# Patient Record
Sex: MALE | Race: BLACK OR AFRICAN AMERICAN | NOT HISPANIC OR LATINO | Employment: OTHER | URBAN - METROPOLITAN AREA
[De-identification: names, ages, dates, MRNs, and addresses within clinical notes are randomized per-mention and may not be internally consistent; named-entity substitution may affect disease eponyms.]

---

## 2023-04-08 PROBLEM — S60.459A SUPERFICIAL FOREIGN BODY OF FINGER: Status: ACTIVE | Noted: 2023-04-08

## 2023-04-08 PROBLEM — K64.4 INFLAMED EXTERNAL HEMORRHOID: Status: ACTIVE | Noted: 2023-04-08

## 2023-04-08 PROBLEM — T63.441A BEE STING: Status: ACTIVE | Noted: 2023-04-08

## 2023-04-08 PROBLEM — S80.812A ABRASION OF LEFT LEG: Status: ACTIVE | Noted: 2023-04-08

## 2023-04-08 PROBLEM — R09.81 NASAL CONGESTION: Status: ACTIVE | Noted: 2023-04-08

## 2023-04-08 PROBLEM — G51.0 BELL'S PALSY: Status: ACTIVE | Noted: 2023-04-08

## 2023-04-08 RX ORDER — POLYETHYLENE GLYCOL 3350 17 G/17G
POWDER, FOR SOLUTION ORAL
COMMUNITY
Start: 2021-05-28

## 2023-04-08 RX ORDER — LIDOCAINE 50 MG/G
OINTMENT TOPICAL
COMMUNITY
Start: 2021-06-03

## 2023-04-08 RX ORDER — DOCUSATE SODIUM 100 MG/1
1 CAPSULE, LIQUID FILLED ORAL 2 TIMES DAILY
COMMUNITY
Start: 2021-05-28

## 2023-04-08 RX ORDER — HYDROCORTISONE 25 MG/G
OINTMENT TOPICAL
COMMUNITY
Start: 2021-06-03

## 2023-04-10 ENCOUNTER — OFFICE VISIT (OUTPATIENT)
Dept: PRIMARY CARE | Facility: CLINIC | Age: 53
End: 2023-04-10
Payer: COMMERCIAL

## 2023-04-10 VITALS
WEIGHT: 131 LBS | HEIGHT: 65 IN | BODY MASS INDEX: 21.83 KG/M2 | TEMPERATURE: 98.3 F | DIASTOLIC BLOOD PRESSURE: 72 MMHG | HEART RATE: 82 BPM | OXYGEN SATURATION: 98 % | SYSTOLIC BLOOD PRESSURE: 122 MMHG | RESPIRATION RATE: 17 BRPM

## 2023-04-10 DIAGNOSIS — R20.0 NUMBNESS AND TINGLING IN LEFT ARM: ICD-10-CM

## 2023-04-10 DIAGNOSIS — R20.0 NUMBNESS AND TINGLING IN LEFT ARM: Primary | ICD-10-CM

## 2023-04-10 DIAGNOSIS — R20.2 NUMBNESS AND TINGLING IN LEFT ARM: Primary | ICD-10-CM

## 2023-04-10 DIAGNOSIS — R20.2 NUMBNESS AND TINGLING IN LEFT ARM: ICD-10-CM

## 2023-04-10 PROCEDURE — 99213 OFFICE O/P EST LOW 20 MIN: CPT | Performed by: STUDENT IN AN ORGANIZED HEALTH CARE EDUCATION/TRAINING PROGRAM

## 2023-04-10 RX ORDER — CYCLOBENZAPRINE HCL 5 MG
5 TABLET ORAL NIGHTLY PRN
Qty: 14 TABLET | Refills: 0 | Status: SHIPPED | OUTPATIENT
Start: 2023-04-10 | End: 2023-04-24

## 2023-04-10 RX ORDER — METHYLPREDNISOLONE 4 MG/1
TABLET ORAL
Qty: 21 TABLET | Refills: 0 | Status: SHIPPED | OUTPATIENT
Start: 2023-04-10 | End: 2023-04-17

## 2023-04-10 RX ORDER — CYCLOBENZAPRINE HCL 5 MG
5 TABLET ORAL NIGHTLY PRN
Qty: 14 TABLET | Refills: 0 | Status: SHIPPED | OUTPATIENT
Start: 2023-04-10 | End: 2023-04-10 | Stop reason: SDUPTHER

## 2023-04-10 RX ORDER — METHYLPREDNISOLONE 4 MG/1
TABLET ORAL
Qty: 21 TABLET | Refills: 0 | Status: SHIPPED | OUTPATIENT
Start: 2023-04-10 | End: 2023-04-10 | Stop reason: SDUPTHER

## 2023-04-10 ASSESSMENT — ENCOUNTER SYMPTOMS
SHORTNESS OF BREATH: 0
NUMBNESS: 1
CHILLS: 0
CONFUSION: 0
DIZZINESS: 0
FEVER: 0

## 2023-04-10 NOTE — PROGRESS NOTES
I reviewed with the resident the medical history and the resident’s findings on physical examination.  I discussed with the resident the patient’s diagnosis and concur with the treatment plan as documented in the resident note.   Patient has had the symptoms for a few months.  Pretty constant.  Who radiate to the hand.  Involves most of the hand.  He does do repetitive work.  No trauma, no weakness.  No chest pain or shortness of breath.  No sweating no other symptoms.  We will get x-rays patient to do exercises we will try medication if it still going on he will need EMG possible MRI of the neck.  May need to see orthopedics  Patient aware this can cause long-term damage to the muscles of it does not improve.  If any chest pain shortness of breath any nausea vomiting and increased symptoms any weakness any change in the pattern notify the office or go to the ER  Follow-up in 2 to 4 weeks  Agree with assessment and plan  Darrian Carter, DO

## 2023-04-10 NOTE — PROGRESS NOTES
"Subjective   Patient ID: Jacob Horta is a 53 y.o. male who presents for Numbness.    HPI   53 year old male presenting to clinic today with a 2 month history of left arm numbness and tingling that originates at the top of his left arm and radiates down to his left hand.    Experiences symptoms daily. Lasts for hours.  Current Management: Warm soaks and flexeril - both have helping    Denies neck or back pain.  Denies current or former injury to the area. No prior surgeries to the area.  Denies recent illness.  No chest pain, diaphoresis, shortness of breath.  Is a current smoker.    Review of Systems   Constitutional:  Negative for chills and fever.   Respiratory:  Negative for shortness of breath.    Cardiovascular:  Negative for chest pain.   Neurological:  Positive for numbness. Negative for dizziness.   Psychiatric/Behavioral:  Negative for confusion.    All other systems reviewed and are negative.      Objective   /72 (BP Location: Left arm, Patient Position: Sitting, BP Cuff Size: Adult)   Pulse 82   Temp 36.8 °C (98.3 °F)   Resp 17   Ht 1.651 m (5' 5\")   Wt 59.4 kg (131 lb)   SpO2 98%   BMI 21.80 kg/m²     Physical Exam  Vitals and nursing note reviewed.   Constitutional:       General: He is not in acute distress.     Appearance: He is not toxic-appearing.   Eyes:      Conjunctiva/sclera: Conjunctivae normal.   Cardiovascular:      Rate and Rhythm: Normal rate and regular rhythm.      Pulses: Normal pulses.   Pulmonary:      Effort: Pulmonary effort is normal.      Breath sounds: Normal breath sounds.   Musculoskeletal:      Right shoulder: No tenderness. Normal range of motion. Normal strength.      Left shoulder: No tenderness. Normal range of motion. Normal strength.      Right upper arm: No tenderness.      Left upper arm: No tenderness.      Right forearm: No tenderness.      Left forearm: No tenderness.      Right wrist: Normal pulse.      Left wrist: Normal pulse.      Cervical back: " Full passive range of motion without pain and normal range of motion. No signs of trauma or tenderness. No pain with movement, spinous process tenderness or muscular tenderness. Normal range of motion.      Thoracic back: Normal. Normal range of motion.      Lumbar back: No tenderness or bony tenderness.   Skin:     Findings: Lesion (Large lipoma at upper thoracic back, midline. Small lipoma noted at left upper thoracic paraspinal.) present.   Neurological:      General: No focal deficit present.      Mental Status: He is alert.      Cranial Nerves: Cranial nerves 2-12 are intact. No cranial nerve deficit.      Sensory: No sensory deficit.      Motor: No weakness.      Comments: Spurling sign negative bilaterally.   Psychiatric:         Mood and Affect: Mood normal.         Assessment/Plan   Problem List Items Addressed This Visit          Nervous    Numbness and tingling in left arm - Primary     Two month history of left arm numbness and tingling without known injury.  No signs / symptoms concerning for cardiac origin.  History consistent with neurologic etiology, possibly cervical radiculopathy.  Physical examination unremarkable today.  Will order cervical and thoracic x rays.  Rx sent for steroid for possible inflammation.   Rx sent for muscle relaxer.  Discussed other conservative management.  Follow up in 2-4 weeks or sooner as needed. If symptoms not resolving, will consider referral to PT or possible EMG for further evaluation.         Relevant Medications    methylPREDNISolone (Medrol Dospak) 4 mg tablets    cyclobenzaprine (Flexeril) 5 mg tablet    Other Relevant Orders    XR cervical spine complete 4-5 views    XR thoracic spine 3 views     Discussed with attending physician.    Rosetta Velez, DO  PGY3

## 2023-04-10 NOTE — ASSESSMENT & PLAN NOTE
Two month history of left arm numbness and tingling without known injury.  No signs / symptoms concerning for cardiac origin.  History consistent with neurologic etiology, possibly cervical radiculopathy.  Physical examination unremarkable today.  Will order cervical and thoracic x rays.  Rx sent for steroid for possible inflammation.   Rx sent for muscle relaxer.  Discussed other conservative management.  Follow up in 2-4 weeks or sooner as needed. If symptoms not resolving, will consider referral to PT or possible EMG for further evaluation.

## 2023-04-13 ENCOUNTER — TELEPHONE (OUTPATIENT)
Dept: PRIMARY CARE | Facility: CLINIC | Age: 53
End: 2023-04-13
Payer: COMMERCIAL

## 2023-04-13 NOTE — TELEPHONE ENCOUNTER
----- Message from Darrian Carter DO sent at 4/12/2023 12:49 PM EDT -----  Patient saw , he has a lot of degenerative changes in his mid cervical and thoracic spine.  Sure he has follow-up with us in the next 2 weeks

## 2023-04-20 ENCOUNTER — TELEPHONE (OUTPATIENT)
Dept: PRIMARY CARE | Facility: CLINIC | Age: 53
End: 2023-04-20
Payer: COMMERCIAL

## 2023-04-20 NOTE — TELEPHONE ENCOUNTER
----- Message from Rosetta Velez DO sent at 4/20/2023  9:12 AM EDT -----  Can someone please call this patient to have him schedule a follow up for re-evaluation of his left arm numbness? Thanks!

## 2023-04-24 ENCOUNTER — OFFICE VISIT (OUTPATIENT)
Dept: PRIMARY CARE | Facility: CLINIC | Age: 53
End: 2023-04-24
Payer: COMMERCIAL

## 2023-04-24 VITALS
BODY MASS INDEX: 22.16 KG/M2 | HEART RATE: 70 BPM | TEMPERATURE: 98.4 F | SYSTOLIC BLOOD PRESSURE: 142 MMHG | DIASTOLIC BLOOD PRESSURE: 88 MMHG | HEIGHT: 65 IN | WEIGHT: 133 LBS | OXYGEN SATURATION: 98 %

## 2023-04-24 DIAGNOSIS — Z12.11 ENCOUNTER FOR SCREENING FOR MALIGNANT NEOPLASM OF COLON: ICD-10-CM

## 2023-04-24 DIAGNOSIS — R20.2 NUMBNESS AND TINGLING IN LEFT ARM: Primary | ICD-10-CM

## 2023-04-24 DIAGNOSIS — Z80.0 FAMILY HISTORY OF COLON CANCER: ICD-10-CM

## 2023-04-24 DIAGNOSIS — R20.0 NUMBNESS AND TINGLING IN LEFT ARM: Primary | ICD-10-CM

## 2023-04-24 PROCEDURE — 99213 OFFICE O/P EST LOW 20 MIN: CPT | Performed by: STUDENT IN AN ORGANIZED HEALTH CARE EDUCATION/TRAINING PROGRAM

## 2023-04-24 RX ORDER — MELOXICAM 15 MG/1
15 TABLET ORAL DAILY
Qty: 30 TABLET | Refills: 1 | Status: SHIPPED | OUTPATIENT
Start: 2023-04-24 | End: 2024-04-23

## 2023-04-24 ASSESSMENT — ENCOUNTER SYMPTOMS
NECK STIFFNESS: 0
NECK PAIN: 0
DIZZINESS: 0
SHORTNESS OF BREATH: 0
LIGHT-HEADEDNESS: 0
CHEST TIGHTNESS: 0
WEAKNESS: 0
NUMBNESS: 1
PALPITATIONS: 0
HEADACHES: 0
BACK PAIN: 0

## 2023-04-24 NOTE — PROGRESS NOTES
"Subjective   Patient ID: Jacob Horta is a 53 y.o. male who presents for Shoulder Pain (Left shoulder follow up./No pain or concerns at the moment).    HPI     53 year old male following up for left arm numbness. Patient was seen two weeks ago for this complaint and was provided with muscle relaxant and a medrol dosepak.    A cervical and thoracic x ray was ordered at that time which showed moderate degenerative changes.    Today, patient notes approximately 40% improvement since his last visit. States the numbness does not happen as often and does not last as long. Only took flexeril once or twice because it made him feel too sedated. Numbness still radiates from left shoulder to hand. Sometimes if he lies on his left elbow this will trigger symptoms. 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 He denies any injury or range of motion difficulty. He is not experiencing symptoms at the time of the visit.    Review of Systems   Respiratory:  Negative for chest tightness and shortness of breath.    Cardiovascular:  Negative for chest pain and palpitations.   Musculoskeletal:  Negative for back pain, neck pain and neck stiffness.   Neurological:  Positive for numbness. Negative for dizziness, weakness, light-headedness and headaches.   All other systems reviewed and are negative.      Objective   /88 (BP Location: Right arm, Patient Position: Sitting, BP Cuff Size: Adult)   Pulse 70   Temp 36.9 °C (98.4 °F) (Temporal)   Ht 1.651 m (5' 5\")   Wt 60.3 kg (133 lb)   SpO2 98%   BMI 22.13 kg/m²     Physical Exam  Vitals and nursing note reviewed.   Constitutional:       General: He is not in acute distress.     Appearance: Normal appearance. He is not toxic-appearing.   Eyes:      Conjunctiva/sclera: Conjunctivae normal.   Cardiovascular:      Rate and Rhythm: Normal rate and regular rhythm.     "  Heart sounds: Normal heart sounds.   Pulmonary:      Effort: Pulmonary effort is normal.   Musculoskeletal:         General: No deformity. Normal range of motion.      Cervical back: Normal range of motion and neck supple. No rigidity or tenderness.   Lymphadenopathy:      Cervical: No cervical adenopathy.   Skin:     General: Skin is warm and dry.   Neurological:      General: No focal deficit present.      Mental Status: He is alert.      Cranial Nerves: No cranial nerve deficit.      Sensory: No sensory deficit.      Motor: No weakness.   Psychiatric:         Mood and Affect: Mood normal.         Assessment/Plan   Problem List Items Addressed This Visit          Nervous    Numbness and tingling in left arm - Primary     Follow up of intermittent left arm numbness. S/p medrol dosepak and reporting 40% improvement in symptoms.  Again, no signs / symptoms concerning for cardiac origin.  Cervical and thoracic x rays notable for moderate degenerative changes.  Physical examination unremarkable today.  Rx sent for meloxicam.   Referral placed for PT.  Discussed other conservative management.  Follow up in 4-6 weeks or sooner as needed. If symptoms not resolving, will consider possible EMG for further evaluation.         Relevant Medications    meloxicam (Mobic) 15 mg tablet    Other Relevant Orders    Referral to Physical Therapy       Other    Encounter for screening for malignant neoplasm of colon                Relevant Orders    Colonoscopy    Family history of colon cancer     Father         Relevant Orders    Colonoscopy     Discussed with attending physician.    Rosetta Velez, DO  PGY3

## 2023-04-24 NOTE — PROGRESS NOTES
I reviewed with the resident the medical history and the resident’s findings on physical examination.  I discussed with the resident the patient’s diagnosis and concur with the treatment plan as documented in the resident note.   Patient symptoms have improved.  Happening less frequently and for possible time.  However patient still having symptoms.  We will start physical therapy.  He declined the EMG at this point.  May need an MRI and the EMG.  Patient aware if any increase symptoms any worsening symptoms any weakness any numbness tingling notify office or go to the ER.  Patient aware if this continues he could have long-term complications.  Follow-up in 3 to 4 weeks  Agree with assessment and plan    Darrian Carter, DO

## 2023-04-24 NOTE — ASSESSMENT & PLAN NOTE
Follow up of intermittent left arm numbness. S/p medrol dosepak and reporting 40% improvement in symptoms.  Again, no signs / symptoms concerning for cardiac origin.  Cervical and thoracic x rays notable for moderate degenerative changes.  Physical examination unremarkable today.  Rx sent for meloxicam.   Referral placed for PT.  Discussed other conservative management.  Follow up in 4-6 weeks or sooner as needed. If symptoms not resolving, will consider possible EMG for further evaluation.

## 2023-07-18 ENCOUNTER — HOSPITAL ENCOUNTER (OUTPATIENT)
Dept: DATA CONVERSION | Facility: HOSPITAL | Age: 53
End: 2023-07-18
Attending: INTERNAL MEDICINE | Admitting: INTERNAL MEDICINE
Payer: COMMERCIAL

## 2023-07-18 DIAGNOSIS — Z12.11 ENCOUNTER FOR SCREENING FOR MALIGNANT NEOPLASM OF COLON: ICD-10-CM

## 2023-07-18 DIAGNOSIS — K64.4 RESIDUAL HEMORRHOIDAL SKIN TAGS: ICD-10-CM

## 2023-07-18 DIAGNOSIS — K57.30 DIVERTICULOSIS OF LARGE INTESTINE WITHOUT PERFORATION OR ABSCESS WITHOUT BLEEDING: ICD-10-CM

## 2023-07-18 DIAGNOSIS — D12.3 BENIGN NEOPLASM OF TRANSVERSE COLON: ICD-10-CM

## 2023-07-18 DIAGNOSIS — Z80.0 FAMILY HISTORY OF MALIGNANT NEOPLASM OF DIGESTIVE ORGANS: ICD-10-CM

## 2023-07-18 DIAGNOSIS — K64.8 OTHER HEMORRHOIDS: ICD-10-CM

## 2023-07-25 LAB
COMPLETE PATHOLOGY REPORT: NORMAL
CONVERTED CLINICAL DIAGNOSIS-HISTORY: NORMAL
CONVERTED FINAL DIAGNOSIS: NORMAL
CONVERTED FINAL REPORT PDF LINK TO COPY AND PASTE: NORMAL
CONVERTED GROSS DESCRIPTION: NORMAL

## 2023-09-29 VITALS
DIASTOLIC BLOOD PRESSURE: 78 MMHG | BODY MASS INDEX: 21.16 KG/M2 | HEART RATE: 57 BPM | SYSTOLIC BLOOD PRESSURE: 118 MMHG | RESPIRATION RATE: 16 BRPM | TEMPERATURE: 97.5 F | WEIGHT: 126.98 LBS | HEIGHT: 65 IN

## 2023-09-30 NOTE — H&P
History of Present Illness:   History Present Illness:  Reason for surgery: Screening colonoscopy   HPI:    Screening colonoscopy.  Family history of colon cancer in his father, aged 54 years.    Allergies:        Allergies:  ·  No Known Allergies :     Home Medication Review:   Home Medications Reviewed: no     Impression/Procedure:   ·  Impression and Planned Procedure: Family history of colon cancer in his father, aged 54 years.  --> For screening colonoscopy       ERAS (Enhanced Recovery After Surgery):  ·  ERAS Patient: no       Vital Signs:  Temperature C: 36.4 degrees C   Temperature F: 97.5 degrees F   Heart Rate: 57 beats per minute   Respiratory Rate: 16 breath per minute   Blood Pressure Systolic: 118 mm/Hg   Blood Pressure Diastolic: 78 mm/Hg     Physical Exam by System:    Respiratory/Thorax: Patent airways, CTAB, normal  breath sounds with good chest expansion, thorax symmetric   Cardiovascular: Regular, rate and rhythm, no murmurs,  2+ equal pulses of the extremities, normal S 1and S 2     Consent:   COVID-19 Consent:  ·  COVID-19 Risk Consent Surgeon has reviewed key risks related to the risk of kimberly COVID-19 and if they contract COVID-19 what the risks are.       Electronic Signatures:  Nic Britt)  (Signed 18-Jul-2023 15:20)   Authored: History of Present Illness, Allergies, Home  Medication Review, Impression/Procedure, ERAS, Physical Exam, Consent, Note Completion      Last Updated: 18-Jul-2023 15:20 by Nic Britt)

## 2024-10-15 ENCOUNTER — OFFICE VISIT (OUTPATIENT)
Dept: PRIMARY CARE | Facility: CLINIC | Age: 54
End: 2024-10-15

## 2024-10-15 VITALS
WEIGHT: 131 LBS | RESPIRATION RATE: 18 BRPM | HEIGHT: 65 IN | BODY MASS INDEX: 21.83 KG/M2 | SYSTOLIC BLOOD PRESSURE: 118 MMHG | OXYGEN SATURATION: 99 % | DIASTOLIC BLOOD PRESSURE: 72 MMHG | HEART RATE: 63 BPM | TEMPERATURE: 97.9 F

## 2024-10-15 DIAGNOSIS — S29.012A STRAIN OF THORACIC BACK REGION: ICD-10-CM

## 2024-10-15 DIAGNOSIS — M54.50 LUMBAR BACK PAIN: ICD-10-CM

## 2024-10-15 DIAGNOSIS — S46.911A RIGHT SHOULDER STRAIN, INITIAL ENCOUNTER: Primary | ICD-10-CM
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
## 2024-10-15 DIAGNOSIS — S63.602A SPRAIN OF LEFT THUMB, UNSPECIFIED SITE OF DIGIT, INITIAL ENCOUNTER: ICD-10-CM

## 2024-10-15 DIAGNOSIS — M79.645 THUMB PAIN, LEFT: ICD-10-CM

## 2024-10-15 PROCEDURE — 3008F BODY MASS INDEX DOCD: CPT

## 2024-10-15 PROCEDURE — 99214 OFFICE O/P EST MOD 30 MIN: CPT

## 2024-10-15 RX ORDER — MELOXICAM 15 MG/1
15 TABLET ORAL DAILY
Qty: 14 TABLET | Refills: 0 | Status: SHIPPED | OUTPATIENT
Start: 2024-10-15 | End: 2024-10-29

## 2024-10-15 RX ORDER — CYCLOBENZAPRINE HCL 10 MG
TABLET ORAL EVERY 8 HOURS PRN
COMMUNITY
Start: 2024-10-13 | End: 2024-10-18

## 2024-10-15 RX ORDER — IBUPROFEN 600 MG/1
1 TABLET ORAL EVERY 6 HOURS PRN
COMMUNITY
Start: 2024-10-13 | End: 2024-10-15 | Stop reason: WASHOUT

## 2024-10-15 ASSESSMENT — ENCOUNTER SYMPTOMS
JOINT SWELLING: 1
ARTHRALGIAS: 1
BACK PAIN: 1

## 2024-10-15 NOTE — PATIENT INSTRUCTIONS
Jacob,     We will refer you to physical therapy. Please call [phone] to schedule.     Please get repeat x-rays in one week on your hand.     Please take Meloxicam once daily for the next two weeks. Do no take any other ibuprofen or aleve with this.
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
## 2024-10-17 NOTE — PROGRESS NOTES
I saw and evaluated the patient. I personally obtained the key and critical portions of the history and physical exam or was physically present for key and critical portions performed by the resident/fellow. I reviewed the resident/fellow's documentation and discussed the patient with the resident/fellow. I agree with the resident/fellow's medical decision making as documented in the note.    Ollie Porter, DO

## 2024-11-06 ENCOUNTER — HOSPITAL ENCOUNTER (OUTPATIENT)
Dept: RADIOLOGY | Facility: CLINIC | Age: 54
Discharge: HOME | End: 2024-11-06
Payer: COMMERCIAL

## 2024-11-06 ENCOUNTER — APPOINTMENT (OUTPATIENT)
Dept: PRIMARY CARE | Facility: CLINIC | Age: 54
End: 2024-11-06
Payer: COMMERCIAL

## 2024-11-06 VITALS
TEMPERATURE: 97.7 F | DIASTOLIC BLOOD PRESSURE: 87 MMHG | HEART RATE: 87 BPM | BODY MASS INDEX: 21.97 KG/M2 | OXYGEN SATURATION: 96 % | WEIGHT: 132 LBS | SYSTOLIC BLOOD PRESSURE: 128 MMHG | RESPIRATION RATE: 16 BRPM

## 2024-11-06 DIAGNOSIS — S69.92XD INJURY OF LEFT THUMB, SUBSEQUENT ENCOUNTER: Primary | ICD-10-CM

## 2024-11-06 DIAGNOSIS — S63.602A SPRAIN OF LEFT THUMB, UNSPECIFIED SITE OF DIGIT, INITIAL ENCOUNTER: ICD-10-CM

## 2024-11-06 PROCEDURE — 73130 X-RAY EXAM OF HAND: CPT | Mod: LT

## 2024-11-06 PROCEDURE — 99213 OFFICE O/P EST LOW 20 MIN: CPT
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
## 2024-11-07 NOTE — PROGRESS NOTES
I saw and evaluated the patient. I personally obtained the key and critical portions of the history and physical exam or was physically present for key and critical portions performed by the resident/fellow. I reviewed the resident/fellow's documentation and discussed the patient with the resident/fellow. I agree with the resident/fellow's medical decision making as documented in the note.    Left thumb pain/swelling - agree with repeat xray to evaluate for fracture.  Agree with hand ortho evaluation.      Jeffery Dias, DO

## 2024-11-20 ENCOUNTER — OFFICE VISIT (OUTPATIENT)
Dept: ORTHOPEDIC SURGERY | Facility: CLINIC | Age: 54
End: 2024-11-20
Payer: COMMERCIAL

## 2024-11-20 DIAGNOSIS — S63.642A SPRAIN OF ULNAR COLLATERAL LIGAMENT OF METACARPOPHALANGEAL (MCP) JOINT OF LEFT THUMB, INITIAL ENCOUNTER: ICD-10-CM

## 2024-11-20 DIAGNOSIS — S69.92XD INJURY OF LEFT THUMB, SUBSEQUENT ENCOUNTER: ICD-10-CM

## 2024-11-20 PROCEDURE — L3924 HFO WITHOUT JOINTS PRE OTS: HCPCS | Performed by: ORTHOPAEDIC SURGERY

## 2024-11-20 PROCEDURE — 99214 OFFICE O/P EST MOD 30 MIN: CPT | Mod: GC | Performed by: ORTHOPAEDIC SURGERY

## 2024-11-20 PROCEDURE — 99204 OFFICE O/P NEW MOD 45 MIN: CPT | Performed by: ORTHOPAEDIC SURGERY
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
## 2024-11-28 PROBLEM — S63.642A SPRAIN OF ULNAR COLLATERAL LIGAMENT OF METACARPOPHALANGEAL (MCP) JOINT OF LEFT THUMB: Status: ACTIVE | Noted: 2024-11-28

## 2025-01-10 ENCOUNTER — CLINICAL SUPPORT (OUTPATIENT)
Dept: OCCUPATIONAL THERAPY | Facility: CLINIC | Age: 55
End: 2025-01-10
Payer: COMMERCIAL

## 2025-01-10 DIAGNOSIS — M79.645 PAIN OF LEFT THUMB: ICD-10-CM

## 2025-01-10 DIAGNOSIS — S63.642D SPRAIN OF ULNAR COLLATERAL LIGAMENT OF METACARPOPHALANGEAL (MCP) JOINT OF LEFT THUMB, SUBSEQUENT ENCOUNTER: Primary | ICD-10-CM

## 2025-01-10 PROCEDURE — 97165 OT EVAL LOW COMPLEX 30 MIN: CPT | Mod: GO

## 2025-01-10 PROCEDURE — 97035 APP MDLTY 1+ULTRASOUND EA 15: CPT | Mod: GO
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
## 2025-01-17 ENCOUNTER — TREATMENT (OUTPATIENT)
Dept: OCCUPATIONAL THERAPY | Facility: CLINIC | Age: 55
End: 2025-01-17
Payer: COMMERCIAL

## 2025-01-17 DIAGNOSIS — M79.645 PAIN OF LEFT THUMB: ICD-10-CM

## 2025-01-17 DIAGNOSIS — S63.642D SPRAIN OF ULNAR COLLATERAL LIGAMENT OF METACARPOPHALANGEAL (MCP) JOINT OF LEFT THUMB, SUBSEQUENT ENCOUNTER: Primary | ICD-10-CM

## 2025-01-17 PROCEDURE — 97112 NEUROMUSCULAR REEDUCATION: CPT | Mod: GO

## 2025-01-17 PROCEDURE — 97035 APP MDLTY 1+ULTRASOUND EA 15: CPT | Mod: GO

## 2025-01-17 NOTE — PROGRESS NOTES
Occupational Therapy               Patient Name: Jacob Horta  MRN: [id number]  Today's Date: 1/17/2025     Reason For Visit    Occupational therapy follow visit.      Client Input    Client's primary Goal: Return to previous level of function and independence.     Adult Risk Screening  There are no spiritual/cultural practices/values/needs that are important to know   Initial Fall Risk Screening:   The client has not fallen in the last 6 months. The client has no fall injury. The client does not have a fear of falling. The client does not need assistance with sitting, standing or walking. The client does not need assistance walking in his home. The client does not need assistance in an unfamiliar setting. The patient is not using an assistive device.        Fall Risk: none     Insurance: Reviewed   Visit number: 2    Subjective: I still hurt but am doing more.     The patient's primary form of communication is English. There are no language barriers. Social interaction is appropriate with good interactive skills noted. Difficulty With Movement, Home Management, Work, ADL'S    Objective:     Interventions  Modalities: Ultrasound  Joint Mobilizations: [L] Thumb.  Thumb opposition increased to touch proximal crease of left small finger.   Therapeutic Exercises AROM, PROM, Strengthening, Stretching, Theraputty, Fine Motor Manipulation, Gross Motor Skills, Grasp, Pinch, Release  Patient Education:  Edema control to continue. Composite flexion and opposition exercises to continue. Resistive exercises with egg encouraged for strengthening.   Therapist instructed patient on home exercise program, patient verbalized understanding. Primary form of preferred communication utilized, English.     Assessment:   The client tolerated well.  Motion gains noted with stretching and soft tissue mobilizations. Focused grasp and release tasks to progress for functional abilities and in hand manipulation.   Activity tolerance  increasing for home and daily tasks.  All therapeutic exercises, modalities and activities performed this date to reduce pain, increase hand function, increase strength and independence in ADL and IADL tasks.    Plan:  Continue with occupational therapy progress with motion and strengthening.  Progress towards ADL and work independence along with functional strengthening.    Time in clinic started at: 0915  Time in clinic ended at:  1000  Timed: 30157 Neuromuscular reeducation, 30  38199 Ultrasound, 15    Total time in clinic in minutes: 45    Arias Craven, OTR/L, CHT  1. Sprain of ulnar collateral ligament of metacarpophalangeal (MCP) joint of left thumb, subsequent encounter        2. Pain of left thumb